# Patient Record
Sex: MALE | Race: OTHER | HISPANIC OR LATINO | Employment: FULL TIME | URBAN - METROPOLITAN AREA
[De-identification: names, ages, dates, MRNs, and addresses within clinical notes are randomized per-mention and may not be internally consistent; named-entity substitution may affect disease eponyms.]

---

## 2022-08-08 ENCOUNTER — HOSPITAL ENCOUNTER (EMERGENCY)
Facility: HOSPITAL | Age: 22
Discharge: HOME | End: 2022-08-09
Attending: STUDENT IN AN ORGANIZED HEALTH CARE EDUCATION/TRAINING PROGRAM

## 2022-08-08 VITALS
DIASTOLIC BLOOD PRESSURE: 75 MMHG | HEIGHT: 63 IN | RESPIRATION RATE: 20 BRPM | TEMPERATURE: 99 F | WEIGHT: 141.88 LBS | HEART RATE: 73 BPM | SYSTOLIC BLOOD PRESSURE: 114 MMHG | OXYGEN SATURATION: 98 % | BODY MASS INDEX: 25.14 KG/M2

## 2022-08-08 DIAGNOSIS — S40.021A ARM CONTUSION, RIGHT, INITIAL ENCOUNTER: ICD-10-CM

## 2022-08-08 DIAGNOSIS — M79.601 RIGHT ARM PAIN: Primary | ICD-10-CM

## 2022-08-08 PROCEDURE — 99284 EMERGENCY DEPT VISIT MOD MDM: CPT | Mod: 25

## 2022-08-08 NOTE — Clinical Note
"Karlos Rogershallie Ring was seen and treated in our emergency department on 8/8/2022.  He may return to work on 08/11/2022.       If you have any questions or concerns, please don't hesitate to call.      Bhavesh Olivia MD"

## 2022-08-09 PROCEDURE — 63600175 PHARM REV CODE 636 W HCPCS: Performed by: INTERNAL MEDICINE

## 2022-08-09 PROCEDURE — 96372 THER/PROPH/DIAG INJ SC/IM: CPT | Performed by: INTERNAL MEDICINE

## 2022-08-09 RX ORDER — IBUPROFEN 800 MG/1
800 TABLET ORAL 3 TIMES DAILY PRN
Qty: 20 TABLET | Refills: 0 | Status: SHIPPED | OUTPATIENT
Start: 2022-08-09

## 2022-08-09 RX ORDER — KETOROLAC TROMETHAMINE 30 MG/ML
30 INJECTION, SOLUTION INTRAMUSCULAR; INTRAVENOUS
Status: COMPLETED | OUTPATIENT
Start: 2022-08-09 | End: 2022-08-09

## 2022-08-09 RX ADMIN — KETOROLAC TROMETHAMINE 30 MG: 30 INJECTION, SOLUTION INTRAMUSCULAR at 12:08

## 2022-08-09 NOTE — ED PROVIDER NOTES
Encounter Date: 8/8/2022       History     Chief Complaint   Patient presents with    Arm Injury     FELL   ON  RIGHT  ARM  WHILE  PLAYING  SOCCER     Otherwise healthy 22-year-old male presents to ED for right arm pain.  States he was playing soccer yesterday when he was tripped and he fell onto his right arm.  Reporting generalized discomfort from the right elbow to his right collar bone - shoulder region. Generalized muscular tenderness to palpation. Pain worse with range of motion.  Reports intact distal sensation strength of the hand.  Able to fully range the right wrist elbow and shoulder although it is uncomfortable for him.  Denies any midline neck or back pain.  Did not hit his head.  No loss of consciousness.  Denies any significant swelling bruising or skin changes.  States no prior injuries or problems with that arm before.  No lacerations or abrasions.  No other complaints or concerns at this time.  No other extremity involvement.        Review of patient's allergies indicates:  No Known Allergies  No past medical history on file.  No past surgical history on file.  No family history on file.     Review of Systems   Constitutional: Negative for chills and fever.   HENT: Negative for congestion, rhinorrhea and sore throat.    Eyes: Negative for pain, discharge and itching.   Respiratory: Negative for chest tightness and shortness of breath.    Cardiovascular: Negative for chest pain and palpitations.   Gastrointestinal: Negative for abdominal pain, nausea and vomiting.   Genitourinary: Negative for dysuria and hematuria.   Musculoskeletal: Negative for back pain, gait problem, myalgias and neck pain.        Right arm pain   Skin: Negative for color change and rash.   Neurological: Negative for dizziness, weakness and headaches.   Psychiatric/Behavioral: Negative for confusion. The patient is not hyperactive.        Physical Exam     Initial Vitals [08/08/22 2210]   BP Pulse Resp Temp SpO2   114/75 73 20  99 °F (37.2 °C) 98 %      MAP       --         Physical Exam    Constitutional: He appears well-developed and well-nourished. He is not diaphoretic. No distress.   HENT:   Head: Normocephalic and atraumatic.   Eyes: Conjunctivae and EOM are normal. Pupils are equal, round, and reactive to light.   Neck: Neck supple. No tracheal deviation present.   Normal range of motion.  Cardiovascular: Normal rate, regular rhythm and normal heart sounds.   Pulmonary/Chest: Breath sounds normal. No respiratory distress.   Abdominal: Abdomen is soft. There is no abdominal tenderness. There is no rebound.   Musculoskeletal:         General: Normal range of motion.      Cervical back: Normal range of motion and neck supple.      Comments: Patient's upper extremities appear symmetric.  Excellent distal right 2+ radial pulsewith normal  strength and sensation all dermatomes. Has mild-to-moderate discomfort through active range of motion of the right elbow and right shoulder.  Has generalized tenderness of the right elbow humerus and shoulder region and mildly into the clavicle.  No gross deformity.  Skin normal without evidence of trauma.  Other extremities full ROM nonpainful.     Neurological: He is alert and oriented to person, place, and time. He has normal strength. GCS score is 15. GCS eye subscore is 4. GCS verbal subscore is 5. GCS motor subscore is 6.   Skin: Skin is warm and dry. Capillary refill takes less than 2 seconds. No rash noted.   Psychiatric: He has a normal mood and affect. His behavior is normal. Judgment and thought content normal.         ED Course   Procedures  Labs Reviewed - No data to display       Imaging Results          X-Ray Elbow Complete Right (Preliminary result)  Result time 08/09/22 00:16:01    ED Interpretation by Bhavesh Olivia MD (08/09/22 00:16:01, [facility], Emergency Medicine)    No acute abnormalities                             X-Ray Shoulder  Complete 2 View Right (Preliminary result)  Result time 08/09/22 00:16:07    ED Interpretation by Bhavesh Olivia MD (08/09/22 00:16:07, [facility], Emergency Medicine)    No acute abnormalities                            X-Rays:   Independently Interpreted Readings:   Other Readings:  Rt elbow and shoulder: No acute traumatic injuries    Medications   ketorolac injection 30 mg (has no administration in time range)                          Clinical Impression:   Final diagnoses:  [M79.601] Right arm pain (Primary)  [S40.021A] Arm contusion, right, initial encounter          ED Disposition Condition    Discharge Stable        ED Prescriptions     Medication Sig Dispense Start Date End Date Auth. Provider    ibuprofen (ADVIL,MOTRIN) 800 MG tablet Take 1 tablet (800 mg total) by mouth 3 (three) times daily as needed for Pain. 20 tablet 8/9/2022  Bhavesh Olivia MD        Follow-up Information     Follow up With Specialties Details Why Contact Info    [facility] Emergency Medicine  If symptoms worsen [address] [id number] [phone]    [facility]  In 1 month  [address] [facility] [id number] [phone]           Bhavesh Olivia MD  08/09/22 0017